# Patient Record
(demographics unavailable — no encounter records)

---

## 2025-05-12 NOTE — END OF VISIT
[FreeTextEntry3] : I, Dr Shen, evaluated this patient in conjunction with my NP. My history, exam, assessment and plan is reflected in the above note. [Time Spent: ___ minutes] : I have spent [unfilled] minutes of time on the encounter which excludes teaching and separately reported services.

## 2025-05-12 NOTE — REASON FOR VISIT
[Follow-Up Evaluation] : a follow-up evaluation for [Seizure] : seizure [Home] : at home, [unfilled] , at the time of the visit. [Medical Office: ([facility])___] : at the medical office located in  [Mother] : mother [FreeTextEntry2] : Mother

## 2025-05-15 NOTE — PHYSICAL EXAM
[Well-appearing] : well-appearing [Normocephalic] : normocephalic [No dysmorphic facial features] : no dysmorphic facial features [Straight] : straight [No salvador or dimples] : no salvador or dimples [Alert] : alert [No abnormal involuntary movements] : no abnormal involuntary movements [Walks and runs well] : walks and runs well [Good walking balance] : good walking balance [Normal gait] : normal gait [de-identified] : inconsistent eye contact  [de-identified] : nonverbal,

## 2025-05-15 NOTE — PHYSICAL EXAM
[Well-appearing] : well-appearing [Normocephalic] : normocephalic [No dysmorphic facial features] : no dysmorphic facial features [Straight] : straight [No salvador or dimples] : no salvador or dimples [Alert] : alert [No abnormal involuntary movements] : no abnormal involuntary movements [Walks and runs well] : walks and runs well [Good walking balance] : good walking balance [Normal gait] : normal gait [de-identified] : inconsistent eye contact  [de-identified] : nonverbal,

## 2025-05-15 NOTE — PLAN
[FreeTextEntry1] : - Increase Briviact 100mg BID(4.2mg/kg/day).  - Trial increase Risperidone 1mg / 0.5 due to increased head banging at school - Valtoco 15mg- 1 spray in each nostril PRN seizure >3 minutes - Follow up 4 months - sooner for concerns/ breakthrough seizures

## 2025-05-15 NOTE — CONSULT LETTER
[Dear  ___] : Dear  [unfilled], [Courtesy Letter:] : I had the pleasure of seeing your patient, [unfilled], in my office today. [Please see my note below.] : Please see my note below. [Sincerely,] : Sincerely, [FreeTextEntry3] : Makenzie Monsalve CPNP Certified Pediatric Nurse Practitioner  Pediatric Neurology  [facility]

## 2025-05-15 NOTE — HISTORY OF PRESENT ILLNESS
[FreeTextEntry1] : Charlotte is a 10 year old autistic female here for follow up evaluation for seizure disorder.  Charlotte was last seen in 2024.  Her last GTC was 2024. Started on Keppra at that time. She was then admitted to [facility] on from 3/1-3/2/24 for VEEG.  EEG was normal and she was discharged on Keppra 500mg BID. She has been seizure free since 2024.  Mother initially noted some behavior changes but notes that improved with addition of Vit b 6 but she continued with head banging episodes.  Mother called in 2024 due to worsening in head banging episodes at school. Discussed switching LEV to BRV vs starting Clonidine. Mother preferred to add Clonidine which was started 9/10/2024.  She continued to have aggressive behaviors at school and therefore Briviact was started in 10/2024 with minimal improvement in behavior. School continued to note aggressive behaviors therefore Clonidine was switched to Risperidone in 2024 and titrated to 0.5mg BID.  Mother notes she is doing well at home but recently concerns from school regarding head banging episodes with no trigger.    Mother called in 2025 reporting that school noted an episode of eye rolling. Nurse did not feel it was seizure related and Mother had not seen episode at home therefore Briviact was not changed.  Mother does report a few more episodes of brief eye roll since then.       [employer] epilepsy panel revealed VUS in AT, GABR82, and SLC1A2.  Parental testing completed with SLC1A2 reclassified as benign.   Initial Visit:  Charlotte was previously seen here in 2019 for concerns for febrile seizure.  Her first simple febrile seizure was at 16 month, and mother reports ~ 10 episodes. Last seizure was about 2 years ago. Seizures described as generalized tonic stiffening or shaking lasting 1-2 minutes, followed by lethargy x1 hour. Denies history of afebrile seizures, or clear focality during seizures. Charlotte was evaluated by outside Neurologist in Oct 2018, with no testing performed per mother.  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 Genetic counselor communicated results to mother, and explained further parental testing would help determine pathogenicity, but mother opted to defer testing.  She was seen in [facility] ER visit on 2024.  Mother notes seizure episode on 1/15.  Mother went in bedroom, found her sitting and staring straight ahead.  No abnormal movements of extremities.  No abnormal eye movements, patient not responding to mother.  Lasted 1 to 2 minutes, then she was responsive again although mother states more tired than normal. Found to be febrile in emergency department.  She was discharged home.  She had another episode of seizure at 1pm at home and was brought to [facility] again.  Video showed facial grimacing lasted a few minutes and upon arrival in ED was still confused and drowsy. Labs were normal but found to be Flu +.  EEG was performed which was normal in awake, asleep and drowsy state.   Developmentally she is currently in 8:1:3 class.  She is making progress with ADL;s.  Able to get herself dressed and feed herself.  No behavior concerns at home or at school

## 2025-05-15 NOTE — HISTORY OF PRESENT ILLNESS
[FreeTextEntry1] : Charlotte is a 10 year old autistic female here for follow up evaluation for seizure disorder.  Charlotte was last seen in 2024.  Her last GTC was 2024. Started on Keppra at that time. She was then admitted to [facility] on from 3/1-3/2/24 for VEEG.  EEG was normal and she was discharged on Keppra 500mg BID. She has been seizure free since 2024.  Mother initially noted some behavior changes but notes that improved with addition of Vit b 6 but she continued with head banging episodes.  Mother called in 2024 due to worsening in head banging episodes at school. Discussed switching LEV to BRV vs starting Clonidine. Mother preferred to add Clonidine which was started 9/10/2024.  She continued to have aggressive behaviors at school and therefore Briviact was started in 10/2024 with minimal improvement in behavior. School continued to note aggressive behaviors therefore Clonidine was switched to Risperidone in 2024 and titrated to 0.5mg BID.  Mother notes she is doing well at home but recently concerns from school regarding head banging episodes with no trigger.    Mother called in 2025 reporting that school noted an episode of eye rolling. Nurse did not feel it was seizure related and Mother had not seen episode at home therefore Briviact was not changed.  Mother does report a few more episodes of brief eye roll since then.       [employer] epilepsy panel revealed VUS in AT, GABR82, and SLC1A2.  Parental testing completed with SLC1A2 reclassified as benign.   Initial Visit:  Charlotte was previously seen here in 2019 for concerns for febrile seizure.  Her first simple febrile seizure was at 16 month, and mother reports ~ 10 episodes. Last seizure was about 2 years ago. Seizures described as generalized tonic stiffening or shaking lasting 1-2 minutes, followed by lethargy x1 hour. Denies history of afebrile seizures, or clear focality during seizures. Charlotte was evaluated by outside Neurologist in Oct 2018, with no testing performed per mother.  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 Genetic counselor communicated results to mother, and explained further parental testing would help determine pathogenicity, but mother opted to defer testing.  She was seen in [facility] ER visit on 2024.  Mother notes seizure episode on 1/15.  Mother went in bedroom, found her sitting and staring straight ahead.  No abnormal movements of extremities.  No abnormal eye movements, patient not responding to mother.  Lasted 1 to 2 minutes, then she was responsive again although mother states more tired than normal. Found to be febrile in emergency department.  She was discharged home.  She had another episode of seizure at 1pm at home and was brought to [facility] again.  Video showed facial grimacing lasted a few minutes and upon arrival in ED was still confused and drowsy. Labs were normal but found to be Flu +.  EEG was performed which was normal in awake, asleep and drowsy state.   Developmentally she is currently in 8:1:3 class.  She is making progress with ADL;s.  Able to get herself dressed and feed herself.  No behavior concerns at home or at school

## 2025-05-15 NOTE — ASSESSMENT
[FreeTextEntry1] : Charlotte is a 10 year old with autism spectrum disorder and history of recurrent simple febrile seizures. Neurologic examination is limited but notable for findings consistent with underlying ASD, but no focal deficits. Started on Keppra in 2/2024 for 1st unprovoked seizure.  VEEG normal. Risperidone started for worsening in behavior- specifically at school- since staring Keppra.  LEV switched to Brivact and is currently tolerating well.  Recent episode of eye roll unclear if seizure activity but on small dose of Briviact .

## 2025-05-15 NOTE — CONSULT LETTER
[Dear  ___] : Dear  [unfilled], [Courtesy Letter:] : I had the pleasure of seeing your patient, [unfilled], in my office today. [Please see my note below.] : Please see my note below. [Sincerely,] : Sincerely, [FreeTextEntry3] : Makenzie Monsalve CPNP Certified Pediatric Nurse Practitioner  Pediatric Neurology  [facility]